# Patient Record
Sex: MALE | Race: BLACK OR AFRICAN AMERICAN | Employment: FULL TIME | URBAN - METROPOLITAN AREA
[De-identification: names, ages, dates, MRNs, and addresses within clinical notes are randomized per-mention and may not be internally consistent; named-entity substitution may affect disease eponyms.]

---

## 2023-03-13 ENCOUNTER — HOSPITAL ENCOUNTER (EMERGENCY)
Age: 28
Discharge: HOME | End: 2023-03-13
Payer: COMMERCIAL

## 2023-03-13 VITALS
WEIGHT: 235.23 LBS | BODY MASS INDEX: 29.25 KG/M2 | RESPIRATION RATE: 16 BRPM | HEIGHT: 75 IN | TEMPERATURE: 97.6 F | HEART RATE: 78 BPM | DIASTOLIC BLOOD PRESSURE: 85 MMHG | SYSTOLIC BLOOD PRESSURE: 150 MMHG | OXYGEN SATURATION: 99 %

## 2023-03-13 DIAGNOSIS — J01.91 ACUTE RECURRENT SINUSITIS, UNSPECIFIED LOCATION: ICD-10-CM

## 2023-03-13 DIAGNOSIS — R09.81 NASAL CONGESTION: Primary | ICD-10-CM

## 2023-03-13 PROCEDURE — 99283 EMERGENCY DEPT VISIT LOW MDM: CPT

## 2023-03-13 RX ORDER — BENZTROPINE MESYLATE 1 MG/1
TABLET ORAL
COMMUNITY
Start: 2023-02-03

## 2023-03-13 RX ORDER — MEMANTINE HYDROCHLORIDE 5 MG/1
TABLET ORAL
COMMUNITY
Start: 2023-02-03

## 2023-03-13 RX ORDER — AMOXICILLIN AND CLAVULANATE POTASSIUM 875; 125 MG/1; MG/1
1 TABLET, FILM COATED ORAL 2 TIMES DAILY
Qty: 114 TABLET | Refills: 0 | Status: SHIPPED | OUTPATIENT
Start: 2023-03-13 | End: 2023-03-20

## 2023-03-13 RX ORDER — FLUTICASONE PROPIONATE 50 MCG
1 SPRAY, SUSPENSION (ML) NASAL DAILY
Qty: 32 G | Refills: 1 | Status: SHIPPED | OUTPATIENT
Start: 2023-03-13

## 2023-03-13 RX ORDER — RISPERIDONE 4 MG/1
TABLET ORAL
COMMUNITY
Start: 2023-02-03

## 2023-03-13 ASSESSMENT — ENCOUNTER SYMPTOMS
VOMITING: 0
EYE PAIN: 0
ABDOMINAL PAIN: 0
BACK PAIN: 0
RHINORRHEA: 0
SHORTNESS OF BREATH: 0
SINUS PRESSURE: 1
NAUSEA: 0
COUGH: 0
FACIAL SWELLING: 0

## 2023-03-13 NOTE — ED NOTES
Patient ambulatory from ED. AVS provided and discussed with patient. All questions answered. Patient verbalizes understanding of discharge instructions. Respirations even and easy. No obvious distress at this time. Patient advised to take full course of antibiotics as prescribed, even if symptoms begin to subside. Patient verbalizes understanding. Discussed sinus rinse and importance of using premixed saline or boiling tap water and allowing it to cool prior to use. Patient verbalizes understanding.      Erica Negro RN  03/13/23 2342

## 2023-03-13 NOTE — ED PROVIDER NOTES
**ADVANCED PRACTICE PROVIDER, I HAVE EVALUATED THIS PATIENT**        [facility] ENCOUNTER      Pt Name: Laya Lewis  AEW:[id number]  Elsiegfminnie 1995  Date of evaluation: 3/13/2023  Provider: Nithin Mac PA-C  Note Started: 3:55 PM EDT 3/13/2023        Chief Complaint:    Chief Complaint   Patient presents with    Nasal Congestion     Nasal Congestion for weeks, with dry throat (dry). Denies productive cough         Nursing Notes, Past Medical Hx, Past Surgical Hx, Social Hx, Allergies, and Family Hx were all reviewed and agreed with or any disagreements were addressed in the HPI.    HPI: (Location, Duration, Timing, Severity, Quality, Assoc Sx, Context, Modifying factors)    History From: Patient      Social Determinants Significantly Affecting Health : None    Chief Complaint of sinus congestion for the last couple months. Swetha Bardales has been taking DayQuil and NyQuil as well as other over-the-counter sinus medication and not getting relief seem like it made it worse. Denies fever. This is his just to the point where he is breathing through his mouth at night cannot breathe through his nose. No nausea vomiting, no chest pain, no shortness of breath. Denies headache. No other complaints. This is a  33 y.o. male who presents to the emergency room with the above complaint. PastMedical/Surgical History:      Diagnosis Date    Schizophrenia, chronic condition ([address]      No past surgical history on file. Medications:  Previous Medications    BENZTROPINE (COGENTIN) 1 MG TABLET        MEMANTINE (NAMENDA) 5 MG TABLET        RISPERIDONE (RISPERDAL) 4 MG TABLET           Review of Systems:  (1 systems needed)  Review of Systems   Constitutional:  Negative for chills and fever. HENT:  Positive for congestion and sinus pressure. Negative for facial swelling, postnasal drip, rhinorrhea and sneezing. Eyes:  Negative for pain and visual disturbance. Respiratory:  Negative for cough and shortness of breath. Cardiovascular:  Negative for chest pain and leg swelling. Gastrointestinal:  Negative for abdominal pain, nausea and vomiting. Genitourinary:  Negative for dysuria and frequency. Musculoskeletal:  Negative for back pain and neck pain. Skin:  Negative for rash and wound. Neurological:  Negative for dizziness and light-headedness. \"Positives and Pertinent negatives as per HPI\"    Physical Exam:  Physical Exam  Vitals and nursing note reviewed. Constitutional:       Appearance: He is well-developed. He is not diaphoretic. HENT:      Head: Normocephalic and atraumatic. Nose: Congestion present. No nasal deformity, septal deviation, mucosal edema or rhinorrhea. Right Turbinates: Enlarged. Left Turbinates: Enlarged. Right Sinus: No maxillary sinus tenderness or frontal sinus tenderness. Left Sinus: No maxillary sinus tenderness or frontal sinus tenderness. Mouth/Throat:      Mouth: Mucous membranes are moist.      Pharynx: Oropharynx is clear. No oropharyngeal exudate or posterior oropharyngeal erythema. Eyes:      General:         Right eye: No discharge. Left eye: No discharge. Cardiovascular:      Rate and Rhythm: Normal rate and regular rhythm. Heart sounds: Normal heart sounds. No murmur heard. No friction rub. No gallop. Pulmonary:      Effort: Pulmonary effort is normal. No respiratory distress. Breath sounds: Normal breath sounds. No wheezing or rales. Chest:      Chest wall: No tenderness. Musculoskeletal:         General: Normal range of motion. Cervical back: Normal range of motion and neck supple. Skin:     General: Skin is warm and dry. Neurological:      General: No focal deficit present. Mental Status: He is alert and oriented to person, place, and time.    Psychiatric:         Behavior: Behavior normal.       MEDICAL DECISION MAKING    Vitals: Vitals:    03/13/23 1450   BP: (!) 146/85   Pulse: (!) 109   Resp: 14   Temp: 97.6 °F (36.4 °C)   TempSrc: Oral   SpO2: 96%   Weight: 235 lb 3.7 oz (106.7 kg)   Height: 6' 3\" (1.905 m)       LABS:Labs Reviewed - No data to display     Remainder of labs reviewed and were negative at this time or not returned at the time of this note. RADIOLOGY:   Non-plain film images such as CT, Ultrasound and MRI are read by the radiologist. Chichi Villarreal PA-C have directly visualized the radiologic plain film image(s) with the below findings:      Interpretation per the Radiologist below, if available at the time of this note:    No orders to display     No results found. No results found. MEDICAL DECISION MAKING / ED COURSE:      PROCEDURES:   Procedures    Patient was given:  Medications - No data to display    CONSULTS: (Who and What was discussed)  None      Chronic Conditions affecting care:    has a past medical history of Schizophrenia, chronic condition ([facility] [address] Records Reviewed (External and Source)   I personally reviewed patient medical chart    CC/HPI Summary, DDx, ED Course, and Reassessment:   Emergency room course: See HPI and physical exam above  Patient bilaterally nasal passages shows clear mucus nasal turbinates are swollen and erythematous. Throat is clear. Nonerythematous no exudate noted. Neck is supple full range of motion no adenopathy. Cardiovascular regular rhythm, lungs are clear no wheeze rales or rhonchi noted. Patient is alert oriented x4. Does not appear to be in acute distress. At this point I did discuss with patient the discharge plan. I will put him on Augmentin. Give him ENT to follow-up with. Also Afrin nasal spray. Did advise that he do sinus rinse. Informed him to get a kit from the pharmacy and use it as indicated. Make sure you sterile water or saline. He understood and he is okay with this plan. He will be discharged stable condition.       Disposition Considerations (Tests not ordered but considered, Shared Decision Making, Pt Expectation of Test or Tx.):   See discussion above  Differential diagnose: Sinusitis, rebound sinusitis, upper respiratory infection, nasal congestion, viral illness. The patient tolerated their visit well. I evaluated the patient. The physician was available for consultation as needed. The patient and / or the family were informed of the results of any tests, a time was given to answer questions, a plan was proposed and they agreed with plan. I am the Primary Clinician of Record. CLINICAL IMPRESSION:  1. Nasal congestion    2.  Acute recurrent sinusitis, unspecified location        DISPOSITION Decision To Discharge 03/13/2023 04:02:50 PM      PATIENT REFERRED TO:  MARIA DE JESUS [address]  [address] [place] [address]  [phone]    Call   If symptoms worsen    DISCHARGE MEDICATIONS:  New Prescriptions    AMOXICILLIN-CLAVULANATE (AUGMENTIN) 875-125 MG PER TABLET    Take 1 tablet by mouth 2 times daily for 7 days    FLUTICASONE (FLONASE) 50 MCG/ACT NASAL SPRAY    1 spray by Each Nostril route daily       DISCONTINUED MEDICATIONS:  Discontinued Medications    No medications on file              (Please note the MDM and HPI sections of this note were completed with a voice recognition program.  Efforts were made to edit the dictations but occasionally words are mis-transcribed.)    Electronically signed, Gina Brandt PA-C,          Gina Brandt PA-C  03/13/23 5679

## 2023-03-13 NOTE — DISCHARGE INSTRUCTIONS
Do sinus rinse  Use prescribed medication as prescribed only  Make sure you complete the whole 7-day course of antibiotics  Follow-up with Dr. Elysia Weir ENT if worsens or no improvement  Get established with a primary care provider    David Referral number [phone] for 7[facility]  3200 [facility]. 403 [facility]  Fax [phone]  Medical, OB/Gyn, Pediatrics, [address]  Serves all of [facility] (Formerly [address])  [address], [address] [facility]  [address] [place]  [address] [place] [address] (Administrative offices)  [phone]  Homeless only [facility])  100 [facility], [place], [address]  [phone] or [phone], 2097 [facility],   Dental Appointments [phone] or [phone]  Pediatric, Family Practice, X-Ray  Serves all of [facility] ([facility])  [facility] [address]. [place], 42 [facility]  [phone]   [facility] ([place]inyar[place]anda.  Healthy)   199 [facility]    (Located in [id number] or [phone], 2097 [facility],   Dental Appointments [phone] or 5022 [facility]  [address], [address]  [facility]  [address]  [facility] Fax 267 [facility] and Surrounding areas [facility]  1000 [facility]. 620 [facility] Fax [phone]  Medical, OB/Gyn, Pediatrics  Dental Clinic, [facility] limits only     [facility]  1001 [facility]  [phone] Fax [phone]  [facility], Pediatrics, [place] [address]    [phone]  Urgent Care, Open 24 hrs, Urgent care, Gyn, Prenatal, Dental Mental, 300 [facility]   5904 [facility]. Walter, [address] [phone]  (Located: [address])  Pediatrics [phone], 2097 [facility],   Dental Appointments [phone] or [phone] 2500 [facility] 167. [address], [place], Pediatrics, 4100 [facility] 1501 [facility]  [address], [place], [address]  [phone] Fax [phone]  Pediatrics, [facility]  175 [facility]. [address] [address]  [phone]   Fax [phone]  Pediatrics, [address] [place]  [address] [place]. [address]  [address] [facility] [address] [address] [phone]  Pediatrics, Internal Med, [facility], Dental Clinic [facility] 99  4100 [facility] [address]   [phone] [facility]  800 [facility]  [phone] Fax [phone]  [facility]  Sliding scale fee  All [place] area     777 [facility]  5730 [facility]. Sally, [place]  [address]  [address], [address]  5656 [facility]   8200 [facility], 626[address] S [facility]  4418 [facility]  [address] [place] [address].   Sally, [address]  [facility]  [phone]  Fax:  [phone]  [facility] Internal Medicine and Pediatrics  [phone]  Fax:  [phone]  Oxana William Cole  Resident Practice  [phone]  Fax:  0954 [facility]  [phone]  Fax:  [phone] [facility] ([address] of [facility])  [address] [place] [address] [address] (Continued)    [facility], LISA Source of [facility])  [address]  SEAN [address]  94 [facility]   ([facility])   2900 [facility] route Veronique [address] 13, [phone]5Th Floor Family Practice   [facility], [place] Source of [facility])  67 [facility] [address] [place] [address], [facility]  [phone]       [address]. [facility]  ([address] [facility] [address])  [address] Laya, [address] [place] [address]  [address] [facility]  (Health [facility] [place])  [address] [facility] [address]. Felipe [address] S, [facility], [address]  501 [facility]  ([address] of [facility])  [address], [address]  [phone] [address] ([address] of [facility])  [facility], [address] [facility]  [phone]    104 [address]. [facility] 29, [facility]  [phone]  General Family Practice, Pediatrics  Services all areas [facility] 178, [address] [facility] [address]  [address], Pediatrics, [address]   (formerly [facility])  2300 [facility] [address], [address]  [address] [place] [address] ([address])  [address] [place], [address]  [address]  [facility] ([address] of [facility])  [address]    [address]  9911 0053, Prenatal, Dental, Mental, [address] [facility]   [phone]

## 2023-03-13 NOTE — ED TRIAGE NOTES
Complains of nasal congestion whish has led to dry throat at night.  Jake cough.  Clear mucus when he blows his nose.  Denies chest pain, SOB, nausea.

## 2023-03-13 NOTE — Clinical Note
Marce Hung was seen and treated in our emergency department on 3/13/2023. He may return to work on 03/14/2023. If you have any questions or concerns, please don't hesitate to call.       Harika Laird PA-C